# Patient Record
(demographics unavailable — no encounter records)

---

## 2025-04-02 NOTE — PHYSICAL EXAM
[General Appearance - Well Developed] : well developed [General Appearance - Well Nourished] : well nourished [Normal Appearance] : normal appearance [Edema] : no peripheral edema [] : no respiratory distress [Abdomen Soft] : soft [Urethral Meatus] : normal urethra [Normal Station and Gait] : the gait and station were normal for the patient's age [Urinary Bladder Findings] : the bladder was normal on palpation [Skin Turgor] : supple [No Focal Deficits] : no focal deficits [Oriented To Time, Place, And Person] : oriented to person, place, and time [de-identified] : LS&A

## 2025-04-02 NOTE — HISTORY OF PRESENT ILLNESS
[Urinary Incontinence] : urinary incontinence [FreeTextEntry1] : Pt with urine incont.  Just leaks out.  Need pads.  5-7 times per day.  Hx of vulvar bx years ago which was very painful and patient feels like she never recovered from.  Has pain with intercourse.   [Urinary Retention] : no urinary retention [Urinary Urgency] : no urinary urgency [Urinary Frequency] : no urinary frequency [Nocturia] : no nocturia [Straining] : no straining [Weak Stream] : no weak stream

## 2025-05-07 NOTE — PHYSICAL EXAM
[No Acute Distress] : in no acute distress [Oriented x3] : oriented to person, place, and time [No Edema] : ~T edema was not present [Warm and Dry] : was warm and dry to touch [Normal Gait] : gait was normal [Normal Appearance] : general appearance was normal [Atrophy] : atrophy [3] : 3 [Aa ____] : Aa [unfilled] [Ba ____] : Ba [unfilled] [C ____] : C [unfilled] [GH ____] : GH [unfilled] [PB ____] : PB [unfilled] [TVL ____] : TVL  [unfilled] [Ap ____] : Ap [unfilled] [Bp ____] : Bp [unfilled] [D ____] : D [unfilled] [Normal] : no abnormalities [Post Void Residual ____ml] : post void residual was [unfilled] ml [FreeTextEntry2] : Rebecca [Cough] : no cough [Hernia] : no hernia observed [Scar] : no scars

## 2025-05-07 NOTE — ADDENDUM
[FreeTextEntry1] : This note was written by Chinyere Reese, acting as the  for Dr. Pan. This note accurately reflects the work and decisions made by Dr. Pan.

## 2025-05-07 NOTE — OB HISTORY
[Vaginal ___] : [unfilled] vaginal delivery(s) [Approximately ___ (Month)] : the LMP was approximately [unfilled] month(s) ago [Last Pap Smear ___] : date of last pap smear was on [unfilled] [Taking Estrogens] : taking estrogen replacement [Sexually Active] : sexually active [Abnormal Pap Smear] : normal pap smear [FreeTextEntry1] : Largest baby 7lbs. Uses vaginal estrogen once at night also takes oral estrogen

## 2025-05-07 NOTE — HISTORY OF PRESENT ILLNESS
[FreeTextEntry1] : MICHELA is a 74 year female who presents for urinary incontinence and vaginal burning. She is leaking for about the last 2 years and it is getting worse. She leaks with activity, urge and insensible loss. She had a vulvar biopsy about 7 years ago and feels that area is very bothersome. She is sexually active and will tear and have pain in the bottom of her vagina after. She uses estrogen cream and that helps and the tear heal but then she have intercourse again and it tears again. She was diagnosed with lichen sclerosis recently and given steroid cream that she has been using the last 2 weeks and feels that has helped. She has done behavioral modification and withholds fluids to avoid the leaking.     Daytime frequency: yes Nocturia: 2 Urinary urgency: yes Leakage of urine with urgency: yes Leakage of urine with coughing sneezing laughing: yes Incontinence pad use: sometimes Sensation of incomplete bladder emptying: sometimes History of frequent urinary tract infections: denies History of hematuria: denies Previous treatment: behavioral modifications Vaginal symptoms: tearing in vaginal wall near opening Bowel symptoms: constipation

## 2025-05-07 NOTE — LETTER BODY
[Dear  ___] : Dear  [unfilled], [I had the pleasure of evaluating your patient, [unfilled]. Thank you for referring Ms. [unfilled] for consultation for ___] : I had the pleasure of evaluating your patient, [unfilled]. Thank you for referring Ms. [unfilled] for consultation for [unfilled]. [Attached please find my note.] : Attached please find my note. [Thank you very much for allowing me to participate in the care of this patient. If you have any questions, please do not hesitate to contact me] : Thank you very much for allowing me to participate in the care of this patient. If you have any questions, please do not hesitate to contact me. [DrLinnea  ___] : Dr. MOSLEY

## 2025-05-07 NOTE — DISCUSSION/SUMMARY
[FreeTextEntry1] : MICHELA is a 74 year female who presents for mixed UI, vulvar burning. On exam, normal PVR, no POP. We talked about the types of urinary incontinence and the treatment options. We reviewed physical therapy, medication, surgery, vaginal inserts and third line treatments. I recommended bladder testing UDTs. We discussed what is involved in recovery, post-op restrictions including restrictions on lifting, submerging in water and exercise. I sent her urine to the lab. I was able to answer all of her questions. I suggested she is using too much estrogen. She is on oral HRT and using cream every night. I suggested she cut back the cream to 3 times a week and use just a finger tip. I did suggest she continue with the clobetosal. until she sees her GYN.   [] U/A C+S [] UDTs   All questions answered.

## 2025-05-07 NOTE — REASON FOR VISIT
[Initial Visit ___] : an initial visit for [unfilled] [Urinary Incontinence] : urinary incontinence [________] : [unfilled]

## 2025-06-04 NOTE — ADDENDUM
[FreeTextEntry1] : This note was written by Chinyere Reese, acting as the  for Dr. Pan. This note accurately reflects the work and decisions made by Dr. Pna.

## 2025-06-04 NOTE — HISTORY OF PRESENT ILLNESS
[FreeTextEntry1] : MICHELA is a 74 year female who presents for f/u on JOSE LUIS and vulvar burning. Recommended decreasing vaginal estrogen use from nightly to TIW. Also using clobetasol for LS. Here to discuss UDS results and treatment options. She brought with her the pad test that showed she had orange on her pad but could not associated it with anything. She is reporting urgency and frequency.   UDS with no DO, no FOREST, small bladder capacity on 05/14/2025.  UA w/ micro 05/07/2025 - WNL Negative Urine Cx 05/07/2025

## 2025-06-04 NOTE — DISCUSSION/SUMMARY
[FreeTextEntry1] : MICHELA is a 74 year female who presents for f/u on JOSE LUIS and vulvar burning. Here to discuss UDS results and treatment options.  We talked about the types of urinary incontinence and the treatment options. We reviewed physical therapy, medication, surgery, vaginal inserts and third line treatments.  We discussed the side effects of OAB medications.  I sent a prescription for mirabegron 25 mg to her pharmacy.  We talked about pelvic floor physical therapy and I referred her to jasmeet.  We discussed in detail what is involved with Botox here in the office and the risks of a cystoscopy and needing to repeat injections every 6 months.  We also talked about PTNS as well as sacral neuromodulation. [] JASMEET [] Mirabegron 25mg   f/u 2 months All questions answered.

## 2025-06-04 NOTE — REASON FOR VISIT
[Follow-Up Visit_____] : a follow-up visit for [unfilled] [Urinary Urgency] : urinary urgency [Urine Frequency] : urine frequency